# Patient Record
Sex: FEMALE | Race: WHITE | NOT HISPANIC OR LATINO | URBAN - METROPOLITAN AREA
[De-identification: names, ages, dates, MRNs, and addresses within clinical notes are randomized per-mention and may not be internally consistent; named-entity substitution may affect disease eponyms.]

---

## 2017-11-15 ENCOUNTER — APPOINTMENT (OUTPATIENT)
Dept: WOMENS IMAGING | Facility: HOSPITAL | Age: 47
End: 2017-11-15

## 2017-11-15 PROCEDURE — 77067 SCR MAMMO BI INCL CAD: CPT | Performed by: RADIOLOGY

## 2017-11-15 PROCEDURE — 77063 BREAST TOMOSYNTHESIS BI: CPT | Performed by: RADIOLOGY

## 2017-11-15 PROCEDURE — G0202 SCR MAMMO BI INCL CAD: HCPCS | Performed by: RADIOLOGY

## 2017-11-27 ENCOUNTER — APPOINTMENT (OUTPATIENT)
Dept: WOMENS IMAGING | Facility: HOSPITAL | Age: 47
End: 2017-11-27

## 2017-11-27 PROCEDURE — G0279 TOMOSYNTHESIS, MAMMO: HCPCS | Performed by: RADIOLOGY

## 2017-11-27 PROCEDURE — G0206 DX MAMMO INCL CAD UNI: HCPCS | Performed by: RADIOLOGY

## 2017-11-27 PROCEDURE — 77061 BREAST TOMOSYNTHESIS UNI: CPT | Performed by: RADIOLOGY

## 2017-11-27 PROCEDURE — 77065 DX MAMMO INCL CAD UNI: CPT | Performed by: RADIOLOGY

## 2017-12-06 ENCOUNTER — APPOINTMENT (OUTPATIENT)
Dept: WOMENS IMAGING | Facility: HOSPITAL | Age: 47
End: 2017-12-06

## 2017-12-06 PROCEDURE — 19081 BX BREAST 1ST LESION STRTCTC: CPT | Performed by: RADIOLOGY

## 2018-06-06 ENCOUNTER — APPOINTMENT (OUTPATIENT)
Dept: WOMENS IMAGING | Facility: HOSPITAL | Age: 48
End: 2018-06-06

## 2018-06-06 PROCEDURE — G0279 TOMOSYNTHESIS, MAMMO: HCPCS | Performed by: RADIOLOGY

## 2018-06-06 PROCEDURE — 77061 BREAST TOMOSYNTHESIS UNI: CPT | Performed by: RADIOLOGY

## 2018-06-06 PROCEDURE — 77065 DX MAMMO INCL CAD UNI: CPT | Performed by: RADIOLOGY

## 2020-07-06 ENCOUNTER — APPOINTMENT (OUTPATIENT)
Dept: WOMENS IMAGING | Facility: HOSPITAL | Age: 50
End: 2020-07-06

## 2020-07-06 PROCEDURE — 77067 SCR MAMMO BI INCL CAD: CPT | Performed by: RADIOLOGY

## 2020-07-06 PROCEDURE — 77063 BREAST TOMOSYNTHESIS BI: CPT | Performed by: RADIOLOGY

## 2020-07-30 VITALS
DIASTOLIC BLOOD PRESSURE: 70 MMHG | HEART RATE: 80 BPM | HEART RATE: 89 BPM | SYSTOLIC BLOOD PRESSURE: 107 MMHG | SYSTOLIC BLOOD PRESSURE: 101 MMHG | TEMPERATURE: 97 F | DIASTOLIC BLOOD PRESSURE: 59 MMHG | SYSTOLIC BLOOD PRESSURE: 102 MMHG | RESPIRATION RATE: 22 BRPM | DIASTOLIC BLOOD PRESSURE: 65 MMHG | SYSTOLIC BLOOD PRESSURE: 109 MMHG | SYSTOLIC BLOOD PRESSURE: 115 MMHG | DIASTOLIC BLOOD PRESSURE: 74 MMHG | OXYGEN SATURATION: 91 % | RESPIRATION RATE: 16 BRPM | HEART RATE: 90 BPM | RESPIRATION RATE: 9 BRPM | RESPIRATION RATE: 12 BRPM | HEART RATE: 78 BPM | SYSTOLIC BLOOD PRESSURE: 92 MMHG | HEART RATE: 71 BPM | HEART RATE: 81 BPM | OXYGEN SATURATION: 99 % | SYSTOLIC BLOOD PRESSURE: 108 MMHG | HEART RATE: 83 BPM | HEIGHT: 64 IN | OXYGEN SATURATION: 97 % | TEMPERATURE: 97.8 F | SYSTOLIC BLOOD PRESSURE: 120 MMHG | HEART RATE: 76 BPM | DIASTOLIC BLOOD PRESSURE: 62 MMHG | SYSTOLIC BLOOD PRESSURE: 112 MMHG | OXYGEN SATURATION: 98 % | DIASTOLIC BLOOD PRESSURE: 69 MMHG | DIASTOLIC BLOOD PRESSURE: 76 MMHG | OXYGEN SATURATION: 100 % | SYSTOLIC BLOOD PRESSURE: 89 MMHG | RESPIRATION RATE: 20 BRPM | WEIGHT: 145 LBS | RESPIRATION RATE: 15 BRPM | OXYGEN SATURATION: 95 %

## 2020-07-31 ENCOUNTER — OFFICE (AMBULATORY)
Dept: URBAN - METROPOLITAN AREA LAB 2 | Facility: LAB | Age: 50
End: 2020-07-31
Payer: COMMERCIAL

## 2020-07-31 DIAGNOSIS — Z11.59 ENCOUNTER FOR SCREENING FOR OTHER VIRAL DISEASES: ICD-10-CM

## 2020-07-31 PROCEDURE — 87633 RESP VIRUS 12-25 TARGETS: CPT | Performed by: INTERNAL MEDICINE

## 2020-08-03 PROBLEM — Z12.11 SCREENING FOR COLONIC NEOPLASIA: Status: ACTIVE | Noted: 2020-08-04

## 2020-08-04 ENCOUNTER — AMBULATORY SURGICAL CENTER (AMBULATORY)
Dept: URBAN - METROPOLITAN AREA SURGERY 17 | Facility: SURGERY | Age: 50
End: 2020-08-04
Payer: COMMERCIAL

## 2020-08-04 ENCOUNTER — OFFICE (AMBULATORY)
Dept: URBAN - METROPOLITAN AREA PATHOLOGY 4 | Facility: PATHOLOGY | Age: 50
End: 2020-08-04
Payer: COMMERCIAL

## 2020-08-04 DIAGNOSIS — K63.5 POLYP OF COLON: ICD-10-CM

## 2020-08-04 DIAGNOSIS — Z12.11 ENCOUNTER FOR SCREENING FOR MALIGNANT NEOPLASM OF COLON: ICD-10-CM

## 2020-08-04 LAB
GI HISTOLOGY: A. UNSPECIFIED: (no result)
GI HISTOLOGY: B. UNSPECIFIED: (no result)
GI HISTOLOGY: C. UNSPECIFIED: (no result)
GI HISTOLOGY: PDF REPORT: (no result)

## 2020-08-04 PROCEDURE — 88305 TISSUE EXAM BY PATHOLOGIST: CPT | Mod: 33 | Performed by: INTERNAL MEDICINE

## 2020-08-04 PROCEDURE — 45385 COLONOSCOPY W/LESION REMOVAL: CPT | Mod: 33 | Performed by: INTERNAL MEDICINE

## 2022-01-10 ENCOUNTER — LAB REQUISITION (OUTPATIENT)
Dept: LAB | Facility: HOSPITAL | Age: 52
End: 2022-01-10

## 2022-01-10 DIAGNOSIS — N20.2 CALCULUS OF KIDNEY WITH CALCULUS OF URETER: ICD-10-CM

## 2022-01-10 PROCEDURE — 82365 CALCULUS SPECTROSCOPY: CPT | Performed by: UROLOGY

## 2022-01-14 LAB
CALCIUM OXALATE DIHYDRATE MFR STONE IR: 30 %
COLOR STONE: NORMAL
COM MFR STONE: 70 %
COMPN STONE: NORMAL
LABORATORY COMMENT REPORT: NORMAL
Lab: NORMAL
Lab: NORMAL
PHOTO: NORMAL
SIZE STONE: NORMAL MM
SPEC SOURCE SUBJ: NORMAL
STONE ANALYSIS-IMP: NORMAL
WT STONE: 78 MG

## 2022-10-11 ENCOUNTER — APPOINTMENT (OUTPATIENT)
Dept: WOMENS IMAGING | Facility: HOSPITAL | Age: 52
End: 2022-10-11

## 2022-10-11 PROCEDURE — 77063 BREAST TOMOSYNTHESIS BI: CPT | Performed by: RADIOLOGY

## 2022-10-11 PROCEDURE — 77067 SCR MAMMO BI INCL CAD: CPT | Performed by: RADIOLOGY

## 2023-10-18 ENCOUNTER — APPOINTMENT (OUTPATIENT)
Dept: WOMENS IMAGING | Facility: HOSPITAL | Age: 53
End: 2023-10-18
Payer: COMMERCIAL

## 2023-10-18 PROCEDURE — 77067 SCR MAMMO BI INCL CAD: CPT | Performed by: RADIOLOGY

## 2023-10-18 PROCEDURE — 77063 BREAST TOMOSYNTHESIS BI: CPT | Performed by: RADIOLOGY

## 2023-11-07 ENCOUNTER — OFFICE VISIT (OUTPATIENT)
Dept: ORTHOPEDIC SURGERY | Facility: CLINIC | Age: 53
End: 2023-11-07
Payer: COMMERCIAL

## 2023-11-07 VITALS — WEIGHT: 147.3 LBS | BODY MASS INDEX: 26.1 KG/M2 | TEMPERATURE: 97.5 F | HEIGHT: 63 IN

## 2023-11-07 DIAGNOSIS — M17.11 PRIMARY OSTEOARTHRITIS OF RIGHT KNEE: ICD-10-CM

## 2023-11-07 DIAGNOSIS — R52 PAIN: Primary | ICD-10-CM

## 2023-11-07 PROCEDURE — 99203 OFFICE O/P NEW LOW 30 MIN: CPT | Performed by: ORTHOPAEDIC SURGERY

## 2023-11-07 RX ORDER — AZELASTINE 1 MG/ML
1-2 SPRAY, METERED NASAL
COMMUNITY
Start: 2023-06-09 | End: 2024-06-08

## 2023-11-07 RX ORDER — PROGESTERONE 100 MG/1
100 CAPSULE ORAL DAILY
COMMUNITY
Start: 2018-01-01

## 2023-11-07 RX ORDER — ESTRADIOL 0.04 MG/D
1 FILM, EXTENDED RELEASE TRANSDERMAL 2 TIMES WEEKLY
COMMUNITY
Start: 2022-01-01

## 2023-11-07 RX ORDER — LISDEXAMFETAMINE DIMESYLATE CAPSULES 40 MG/1
40 CAPSULE ORAL DAILY
COMMUNITY
Start: 2010-01-01 | End: 2024-01-04

## 2023-11-07 RX ORDER — TRIAMCINOLONE ACETONIDE 55 UG/1
SPRAY, METERED NASAL
COMMUNITY

## 2023-11-07 RX ORDER — HYDROXYZINE HYDROCHLORIDE 10 MG/1
10 TABLET, FILM COATED ORAL
COMMUNITY
Start: 2023-11-02 | End: 2023-12-02

## 2023-11-07 NOTE — PROGRESS NOTES
"Patient: Nelida Stallworth  YOB: 1970 53 y.o. female  Medical Record Number: [id number]    Chief Complaints:   Chief Complaint   Patient presents with    Right Knee - Initial Evaluation       History of Present Illness:Nelida Stallworth is a 53 y.o. 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 Seems like its improving.    Allergies: No Known Allergies    Medications:   Current Outpatient Medications   Medication Sig Dispense Refill    azelastine (ASTELIN) 0.1 % nasal spray 1-2 sprays into the nostril(s) as directed by provider.      estradiol (VIVELLE-DOT) 0.0375 MG/24HR patch Place 1 patch on the skin as directed by provider 2 (Two) Times a Week.      hydrOXYzine (ATARAX) 10 MG tablet Take 1 tablet by mouth.      lisdexamfetamine (VYVANSE) 40 MG capsule Take 1 capsule by mouth Daily      Progesterone (PROMETRIUM) 100 MG capsule Take 1 capsule by mouth Daily.      Triamcinolone Acetonide (NASACORT) 55 MCG/ACT nasal inhaler into the nostril(s) as directed by provider.       No current facility-administered medications for this visit.         The following portions of the patient's history were reviewed and updated as appropriate: allergies, current medications, past family history, past medical history, past social history, past surgical history and problem list.    Review of Systems:   Pertinent positives/negatives listed in HPI above    Physical Exam:   Vitals:    11/07/23 1026   Temp: 97.5 °F (36.4 °C)   Weight: 66.8 kg (147 lb 4.8 oz)   Height: 160 cm (63\")   PainSc:   6   PainLoc: Knee       General: A and O x 3, ASA, NAD      Knee Exam List: Knee:  right    ALIGNMENT:     Neutral  ,   Patella tracks   midline    GAIT:    Antalgic    SKIN:    No abnormality    RANGE OF MOTION:   Painful flexion    STRENGTH:   5 / 5    LIGAMENTS:    No varus / valgus instability.   Negative  " Lachman.    MENISCUS:     Positive  medial   Samantha       DISTAL PULSES:    Paplable    DISTAL SENSATION :   Intact    LYMPHATICS:     No   lymphadenopathy    OTHER:          - No effusion      - No crepitance with ROM        Radiology:  Xrays 3views right knee (ap,lateral, sunrise) were ordered and reviewed for evaluation of knee pain demonstrating mild joint space narrowing medial knee. There are no previous films for comparision.     Assessment/Plan: right knee medial compartment OA -  mild to mdoerate -  I recommended PT, NSAIDs prn, if not better or if it worsens at any point could consider injections.      Diagnoses and all orders for this visit:    1. Pain (Primary)  -     XR Knee 3 View Right         Emil Weinberg MD  11/7/2023

## 2023-11-28 ENCOUNTER — TREATMENT (OUTPATIENT)
Dept: PHYSICAL THERAPY | Facility: CLINIC | Age: 53
End: 2023-11-28
Payer: COMMERCIAL

## 2023-11-28 DIAGNOSIS — Z74.09 IMPAIRED FUNCTIONAL MOBILITY AND ACTIVITY TOLERANCE: ICD-10-CM

## 2023-11-28 DIAGNOSIS — M17.11 PRIMARY OSTEOARTHRITIS OF RIGHT KNEE: ICD-10-CM

## 2023-11-28 DIAGNOSIS — S83.249A ACUTE TEAR OF POSTERIOR HORN OF MEDIAL MENISCUS: Primary | ICD-10-CM

## 2023-11-28 DIAGNOSIS — M25.561 RIGHT KNEE PAIN, UNSPECIFIED CHRONICITY: ICD-10-CM

## 2023-11-28 PROCEDURE — 97530 THERAPEUTIC ACTIVITIES: CPT | Performed by: PHYSICAL THERAPIST

## 2023-11-28 PROCEDURE — 97110 THERAPEUTIC EXERCISES: CPT | Performed by: PHYSICAL THERAPIST

## 2023-11-28 PROCEDURE — 97161 PT EVAL LOW COMPLEX 20 MIN: CPT | Performed by: PHYSICAL THERAPIST

## 2023-11-28 NOTE — PROGRESS NOTES
Physical Therapy Initial Evaluation and Plan of Care    [address]   [place] [address]  [phone]  Patient: Nelida Stallworth   : 1970  Diagnosis/ICD-10 Code:  Right knee pain, unspecified chronicity [M25.561]  Referring practitioner: Emil Weinberg MD  Date of Initial Visit: 2023  Today's Date: 2023  Patient seen for 1 session         Visit Diagnoses:    ICD-10-CM ICD-9-CM   1. Right knee pain, unspecified chronicity  M25.561 719.46   2. Primary osteoarthritis of right knee  M17.11 715.16   3. Impaired functional mobility and activity tolerance  Z74.09 V49.89   4. 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  Felt a click in my R knee turning to get out of car and also squatted to get a kid from under the bed.  Went to [facility] - MRI showed a tear in posterior horn or meniscus and a Baker's cyst. Saw a PA who suggested surgery but didn't want. Dr. Weinberg said no surgery at this point - need to try cortisone and PT.  Just now coming out of respiratory illness - on Steroids.  Bakers's cyst much smaller now. New X-rays show mild-mod DJD.  Wear supportive shoes with PowerStep orthotics.  Ganglion cyst L and erazo's neuroma sx lateral ankle. Bursitis hips. Knee clicks still and some giving way initially. Lack confidence in it. No other significant PMH.      Patient Occupation: realtor; golf, dance   Precautions and Work Restrictions: nonePain  Current pain ratin  At worst pain ratin  Location: medial R knee  Quality: tight (weak)  Relieving factors: ice, heat and medications (straighten knee; movement)  Aggravating factors: stairs, prolonged positioning and squatting (driving)  Progression: improved    Social Support  Lives in: multiple-level home    Diagnostic Tests  X-ray:  abnormal  MRI studies: abnormal    Treatments  Previous treatment: medication  Current treatment: physical therapy  Patient Goals  Patient goals for therapy: return to sport/leisure activities  Patient goal: keep from getting worse - preserve it and avoid surgery           Objective          Palpation     Right   Hypertonic in the distal biceps femoris. Tenderness of the distal biceps femoris.     Tenderness     Right Knee   Tenderness in the medial joint line, pes anserinus and quadriceps tendon.     Active Range of Motion   Left Knee   Flexion: 130 degrees   Extension: 0 degrees     Right Knee   Flexion: 130 degrees   Extension: 0 degrees     Additional Active Range of Motion Details  Popped R    Strength/Myotome Testing     Left Hip   Planes of Motion   External rotation: 4+    Right Hip   Planes of Motion   Flexion: 4  Abduction: 5 (seated)  Adduction: 4  External rotation: 4+    Left Knee   Flexion: 5  Extension: 5    Right Knee   Flexion: 4+  Extension: 4-    Tests     Additional Tests Details  Deferred - MRI confirmed MMT    Right Knee Flexibility Comments:   HS, quad/hip flexors    Ambulation     Ambulation: Level Surfaces   Ambulation without assistive device: independent    Additional Level Surfaces Ambulation Details  Guarded but non-antalgic; B over pronation          Assessment & Plan       Assessment  Impairments: activity intolerance, impaired physical strength, lacks appropriate home exercise program and pain with function   Functional limitations: (Prolonged positions, driving, squatting, stairs)  Assessment details: Nelida Stallworth is 53 y.o. female who presents today to Physical Therapy for persistent pain from acute onset several months ago.  Imaging confirmed mild-mod OA, Baker's cyst and MMT - posterior horn. Has noted some recent improvement while being treated with steroids for respiratory condition but still with intermittent popping/clicking and fearful of giving way. Goal is to avoid  surgery. Presents today with mild-mod weakness and hip/knee mm tightness.  ROM is preserved. Patient would benefit from skilled physical therapy to address functional limitations and impairments to improve quality of life.  Prognosis: good    Goals  Plan Goals: STGs x 2 wks  1. Increasing flexibility and strength for improved ADLs  2. Pain with ADLs < 6/10  3. Review body mechanics and joint protection principles with ADLs  4. Ambulates level surface and 4-6 inch steps with min to no antalgia  5. Demonstrates tolerance for and compliance with initial HEP    LTGs x 4 wks  1. Independent with HEP and joint protection principles for maintenance and prevention  2. Strength 4+ to 5/5 per MMT  3. Ambulates 5 min and 6-8 inch steps with normal gait  4. Report decreased popping and clicking and no episodes of giving way in past week.  5. Demonstrates tolerance for sitting, including driving, for 60 min  6. Improved LEFS to < 30%    Plan  Therapy options: will be seen for skilled therapy services  Planned modality interventions: cryotherapy and ultrasound  Planned therapy interventions: manual therapy, soft tissue mobilization, therapeutic activities, strengthening, stretching, neuromuscular re-education, body mechanics training, balance/weight-bearing training and home exercise program  Frequency: 2x week  Duration in weeks: 4  Treatment plan discussed with: patient        History # of Personal Factors and/or Comorbidities: MODERATE (1-2)  Examination of Body System(s): # of elements: LOW (1-2)  Clinical Presentation: STABLE   Clinical Decision Making: LOW       Timed:         Manual Therapy:    0     mins  78885;     Therapeutic Exercise:    16     mins  33550;     Neuromuscular Gautam:    0    mins  23356;    Therapeutic Activity:     20     mins  61537;     Gait Trainin     mins  71488;     Ultrasound:     0     mins  22261;    Ionto                               0    mins   58396  Self Care                        0     mins   27327      Un-Timed:  Electrical Stimulation:    0     mins  98333 ( );  Dry Needling     0     mins self-pay  Traction     0     mins 36332  Low Eval     22     Mins  53794  Mod Eval     0     Mins  07579  High Eval                       0     Mins  88324  Canalith Repos    0     mins 46470      Timed Treatment:   36   mins   Total Treatment:     58   mins          PT: Jayda Torres PT     License Number: [id number]  Electronically signed by Jayda Torres PT, 11/28/23, 10:43 AM EST    Certification Period: 11/28/2023 thru 2/25/2024  I certify that the therapy services are furnished while this patient is under my care.  The services outlined above are required by this patient, and will be reviewed every 90 days.         Physician Signature:__________________________________________________    PHYSICIAN: Emil Weinberg MD  NPI: [id number]                                      DATE:      Please sign and return via fax to [email or web address] . Thank you, [facility].

## 2023-12-06 ENCOUNTER — TREATMENT (OUTPATIENT)
Dept: PHYSICAL THERAPY | Facility: CLINIC | Age: 53
End: 2023-12-06
Payer: COMMERCIAL

## 2023-12-06 DIAGNOSIS — S83.249A ACUTE TEAR OF POSTERIOR HORN OF MEDIAL MENISCUS: ICD-10-CM

## 2023-12-06 DIAGNOSIS — M25.561 RIGHT KNEE PAIN, UNSPECIFIED CHRONICITY: Primary | ICD-10-CM

## 2023-12-06 DIAGNOSIS — M17.11 PRIMARY OSTEOARTHRITIS OF RIGHT KNEE: ICD-10-CM

## 2023-12-06 DIAGNOSIS — Z74.09 IMPAIRED FUNCTIONAL MOBILITY AND ACTIVITY TOLERANCE: ICD-10-CM

## 2023-12-06 NOTE — PROGRESS NOTES
Physical Therapy Daily Treatment Note    Visit Diagnoses:    ICD-10-CM ICD-9-CM   1. Right knee pain, unspecified chronicity  M25.561 719.46   2. Primary osteoarthritis of right knee  M17.11 715.16   3. Impaired functional mobility and activity tolerance  Z74.09 V49.89   4. Acute tear of posterior horn of medial meniscus  S83.249A 836.0       VISIT#: 2      Nelida Stallworth reports: she is coming off steroids and is feeling a little jittery. Her knee is also getting a little more swelling and pain and her calf feels tight. Her Baker's cyst is about gone. She has a meniscal tear and runner's knee (PFS).   Current Pain Level:    4/10; Worst:   6-7/10; Best:  0/10  Affected Area: medial R knee   Quality of Pain: tight (weak)  Functional Deficits/Irritating Factors: stairs, prolonged positioning and squatting (driving)   Progression: major progress compared to where she was.  Compliance with HEP Reported: yes    Objective  Presents: 13 min late for appt. Due to traffic  Increased sets/reps of:  calf stretch   Increased resistance on:  none  Added to Program: KT Tape to knee to unload medial knee and lift patella.  SciFit, Step up and lateral step up    Tender in quad and patella tendons, Pes anserine/joint line area  Shakiness in leg during SLR    See Exercise, Manual, and Modality Logs for complete treatment.     Patient Education: Pt was educated on exercise biomechanical correctness, intensity, and speed.     Assessment:  Nelida has recently finished her steroids so having some increase in swelling and pain. Did not note any swelling in knee today but she had on stretch tights and did not compare with L knee. Applied KT tape with verbal instructions to remove if redness, rash, itching, etc occurs. Yuniel v/u. Progressed through her exercises nicely. Quad is quite weak, with shakiness present during SLR.  Needs reminders to count reps.  Pt will continue to benefit from skilled PT interventions to address current  functional deficits and impairments.       Plan: Progress to/Continue with current program.       Timed:  Manual Therapy:            0_    mins  98339;  Ultrasound:                     0      mins  74995;   Therapeutic Exercise:    25     mins  11022;     Neuromuscular Gautam:   0     mins  98766;    Therapeutic Activity:      20     mins  81261;      Iontophoresis              _0__   mins  Dry Needling               _0____   mins         Untimed:  Work Conditioning: __0__ mins 13583  Electrical Stimulation:    0    mins  37850 ( );  Mechanical Traction:       0        mins  03978;   Paraffin                       __0___  mins   Ice/Heat: __0__ mins      Timed Treatment:   45   mins   Total Treatment:     45   mins          BRET Rodney License # [id number]  Physical Therapist Assistant

## 2023-12-12 ENCOUNTER — TREATMENT (OUTPATIENT)
Dept: PHYSICAL THERAPY | Facility: CLINIC | Age: 53
End: 2023-12-12
Payer: COMMERCIAL

## 2023-12-12 DIAGNOSIS — S83.249A ACUTE TEAR OF POSTERIOR HORN OF MEDIAL MENISCUS: ICD-10-CM

## 2023-12-12 DIAGNOSIS — Z74.09 IMPAIRED FUNCTIONAL MOBILITY AND ACTIVITY TOLERANCE: ICD-10-CM

## 2023-12-12 DIAGNOSIS — M25.561 RIGHT KNEE PAIN, UNSPECIFIED CHRONICITY: Primary | ICD-10-CM

## 2023-12-12 DIAGNOSIS — M17.11 PRIMARY OSTEOARTHRITIS OF RIGHT KNEE: ICD-10-CM

## 2023-12-12 NOTE — PROGRESS NOTES
Physical Therapy Daily Treatment Note      [address] [facility]   [place] [address] [phone]  Patient: Nelida Stallworth   : 1970  Referring practitioner: Emil Weinberg MD   Date of Initial Visit: Type: THERAPY  Noted: 2023  Today's Date: 2023  Patient seen for 3 sessions          Visit Diagnoses:    ICD-10-CM ICD-9-CM   1. Right knee pain, unspecified chronicity  M25.561 719.46   2. Primary osteoarthritis of right knee  M17.11 715.16   3. Impaired functional mobility and activity tolerance  Z74.09 V49.89   4. Acute tear of posterior horn of medial meniscus  S83.249A 836.0       Subjective   The tape felt a little uncomfortable and restrictive while on and painful when taking off but the clicking is less. Was sore after last session.  Feel better since off steroids - it had me jittery.    Objective   See Exercise, Manual, and Modality Logs for complete treatment.   Light bruising still at distal/medial quad    Assessment/Plan  Responded well to addition of US and lighter taping.  Tolerated progression of strengthening.    Continue to progress per POC as tolerated.  Assess delayed response to changes in Rx.    Timed:         Manual Therapy:    0     mins  50505;     Therapeutic Exercise:    24     mins  67006;     Neuromuscular Gautam:    3    mins  88166;    Therapeutic Activity:     12     mins  88853;     Gait Trainin     mins  88206;     Ultrasound:     9     mins  66378;    Ionto                               0    mins   61470  Self Care                       0     mins   97127      Un-Timed:  Electrical Stimulation:    0     mins  71422 ( );  Dry Needling     0     mins self-pay  Traction     0     mins 59742  Canalith Repos    0     mins 78552    Timed Treatment:   48   mins   Total Treatment:     48   mins    Jayda Torres, PT  [place] License: [id number]

## 2023-12-15 ENCOUNTER — TREATMENT (OUTPATIENT)
Dept: PHYSICAL THERAPY | Facility: CLINIC | Age: 53
End: 2023-12-15
Payer: COMMERCIAL

## 2023-12-15 DIAGNOSIS — S83.249A ACUTE TEAR OF POSTERIOR HORN OF MEDIAL MENISCUS: ICD-10-CM

## 2023-12-15 DIAGNOSIS — M17.11 PRIMARY OSTEOARTHRITIS OF RIGHT KNEE: ICD-10-CM

## 2023-12-15 DIAGNOSIS — Z74.09 IMPAIRED FUNCTIONAL MOBILITY AND ACTIVITY TOLERANCE: ICD-10-CM

## 2023-12-15 DIAGNOSIS — M25.561 RIGHT KNEE PAIN, UNSPECIFIED CHRONICITY: Primary | ICD-10-CM

## 2023-12-15 NOTE — PROGRESS NOTES
"Physical Therapy Daily Treatment Note    Visit Diagnoses:    ICD-10-CM ICD-9-CM   1. Right knee pain, unspecified chronicity  M25.561 719.46   2. Primary osteoarthritis of right knee  M17.11 715.16   3. Impaired functional mobility and activity tolerance  Z74.09 V49.89   4. Acute tear of posterior horn of medial meniscus  S83.249A 836.0       VISIT#: 4      Nelida Stallworth reports: Her knee is doing good. The taping helped but she wants to see how she does without her knee being taped today. The bruise on her medial mid thigh has decreased in color intensity.  Current Pain Level:    0/10; Worst:   1-2/10; Best:  0/10  Affected Area: medial R knee, medial meniscus   Quality of Pain: tight (weak)  Functional Deficits/Irritating Factors: stairs, prolonged positioning and squatting (driving)   Progression: improving  Compliance with HEP Reported: Yes    Objective    Increased sets/reps of:   time on scifit   Increased resistance on:  SL Clams  Added to Program: Diagonal stepping, Standing Hip Extension        See Exercise, Manual, and Modality Logs for complete treatment.     Patient Education: Pt was educated on exercise biomechanical correctness, intensity, and speed.     Assessment:  Nelida's knee is doing much better per her subjective report. She does her exercises methodically and with good technique. Minimal cues needed and good compliance with her HEP. Her bruising has not decreased in size but is much more difficult to detect on her skin now. She progressed to 6\" step today but toward the end of her reps had some increased knee pain. However, she had to be cautioned not to plant the ball of her foot, turn around to do the step again and twist her knee. This probable aggravated her knee more than the step height. Did keep lateral steps at 4\" height. NO other issues during her session. Pt will continue to benefit from skilled PT interventions to address current functional deficits and impairments. "       Plan: Progress to/Continue with current program.       Timed:  Manual Therapy:            0_    mins  91813;  Ultrasound:                     10      mins  48444;   Therapeutic Exercise:    35     mins  08020;     Neuromuscular Gautam:   0     mins  32141;    Therapeutic Activity:      15     mins  88908;      Iontophoresis              _0__   mins  Dry Needling               _0____   mins         Untimed:  Work Conditioning: __0__ mins 78710  Electrical Stimulation:    0    mins  40941 ( );  Mechanical Traction:       0        mins  01395;   Paraffin                       __0___  mins   Ice/Heat: __0__ mins      Timed Treatment:   60   mins   Total Treatment:     60   mins          BRET Rodney License # [id number]  Physical Therapist Assistant

## 2023-12-18 ENCOUNTER — TREATMENT (OUTPATIENT)
Dept: PHYSICAL THERAPY | Facility: CLINIC | Age: 53
End: 2023-12-18
Payer: COMMERCIAL

## 2023-12-18 DIAGNOSIS — S83.249A ACUTE TEAR OF POSTERIOR HORN OF MEDIAL MENISCUS: ICD-10-CM

## 2023-12-18 DIAGNOSIS — M25.561 RIGHT KNEE PAIN, UNSPECIFIED CHRONICITY: Primary | ICD-10-CM

## 2023-12-18 DIAGNOSIS — M17.11 PRIMARY OSTEOARTHRITIS OF RIGHT KNEE: ICD-10-CM

## 2023-12-18 DIAGNOSIS — Z74.09 IMPAIRED FUNCTIONAL MOBILITY AND ACTIVITY TOLERANCE: ICD-10-CM

## 2023-12-18 NOTE — PROGRESS NOTES
Physical Therapy Daily Treatment Note      [address] [facility]   [place] [address]  [phone]  Patient: Nelida Stallworth   : 1970  Referring practitioner: Emil Weinberg MD   Date of Initial Visit: Type: THERAPY  Noted: 2023  Today's Date: 2023  Patient seen for 5 sessions          Visit Diagnoses:    ICD-10-CM ICD-9-CM   1. Right knee pain, unspecified chronicity  M25.561 719.46   2. Primary osteoarthritis of right knee  M17.11 715.16   3. Impaired functional mobility and activity tolerance  Z74.09 V49.89   4. Acute tear of posterior horn of medial meniscus  S83.249A 836.0       Subjective   Doing pretty good.  Pushed myself a bit last session.  Had some clicking but not too aggravated.  Did OK without the tape. That area that we are doing US on is much better.    Objective   See Exercise, Manual, and Modality Logs for complete treatment.       Assessment/Plan  Good response to US with decreased pain and tenderness at medial quad at bruised area. Tolerating progression of activities with report of muscle fatigue and mild soreness but no increased joint pain at knee.    Continue per POC as tolerated.    Timed:         Manual Therapy:    0     mins  45440;     Therapeutic Exercise:    28    mins  24152;     Neuromuscular Gautam:    0    mins  58753;    Therapeutic Activity:     16     mins  45690;     Gait Trainin     mins  68050;     Ultrasound:     9     mins  97234;    Ionto                               0    mins   19135  Self Care                       0     mins   36963      Un-Timed:  Electrical Stimulation:    0     mins  65307 ( );  Dry Needling     0     mins self-pay  Traction     0     mins 70875  Canalith Repos    0     mins 21742    Timed Treatment:   53   mins   Total Treatment:     53   mins    Jayda Torres, PT  [place] License: [id number]

## 2023-12-26 ENCOUNTER — TREATMENT (OUTPATIENT)
Dept: PHYSICAL THERAPY | Facility: CLINIC | Age: 53
End: 2023-12-26
Payer: COMMERCIAL

## 2023-12-26 NOTE — PROGRESS NOTES
Physical Therapy MD Note/Progreess Note          [address] [place] [address]  [phone]  12/26/2023  Emil Weinberg MD    Re: Nelida Stallworth  1970  Emil Weinberg Md  [address]  [facility] [address]  [place] [address]   ________________________________________________________________    Ms. Nelida Stallworth, has attended 6 PT sessions.  Treatment has consisted of: there-ex/activities, modalities, trial of taping, HEP and pt education     Subjective: Ms. Nelida Stallworth states: think I'm ready to be done with PT.  Still some pain going down stairs but doing OK.  No recent clicking.        Nelida Stallworth reports: she feels ready to just continue on her own    LEFS 11.25%    Objective          Tenderness     Right Knee   No tenderness in the medial joint line, pes anserinus and quadriceps tendon.     Additional Tenderness Details  Distal medial quad at bruised area - mild only now    Active Range of Motion   Left Knee   Flexion: 140 degrees   Extension: 0 degrees     Right Knee   Flexion: 140 degrees   Extension: 0 degrees     Strength/Myotome Testing     Left Hip   Planes of Motion   External rotation: 4+    Right Hip   Planes of Motion   Flexion: 4  Abduction: 5 (seated)  Adduction: 4  External rotation: 4+    Left Knee   Flexion: 5  Extension: 5    Right Knee   Flexion: 5  Extension: 4+    Tests     Additional Tests Details  Deferred - MRI confirmed MMT    Right Knee Flexibility Comments:   HS    Ambulation     Ambulation: Level Surfaces   Ambulation without assistive device: independent    Additional Level Surfaces Ambulation Details  non-antalgic      See Exercise, Manual, and Modality Logs for complete treatment.       Assessment/Plan  Good response to PT with only minimal signs/sxs remaining.  Is independent with a HEP and desires to continue with that and D/C formal PT.    Plan Goals: STGs x 2 wks  1. Increasing flexibility and strength for improved ADLs MET  2. Pain  with ADLs < 6/10 MET  3. Review body mechanics and joint protection principles with ADLs MET  4. Ambulates level surface and 4-6 inch steps with min to no antalgia MET  5. Demonstrates tolerance for and compliance with initial HEP MET     LTGs x 4 wks  1. Independent with HEP and joint protection principles for maintenance and prevention MET  2. Strength 4+ to 5/5 per MMT MET  3. Ambulates 5 min and 6-8 inch steps with normal gait MET  4. Report decreased popping and clicking and no episodes of giving way in past week. MET  5. Demonstrates tolerance for sitting, including driving, for 60 min MET  6. Improved LEFS to < 30% MET    D/C to HEP             Manual Therapy:    0     mins  39642;  Therapeutic Exercise:    12     mins  58895;     Neuromuscular Gautam:    0    mins  95170;    Therapeutic Activity:     19     mins  18512;     Gait Trainin     mins  10164;     Ultrasound:     9     mins  28530;    Work Hardening           0      mins 60083  E-stim                0   mins 86952    Timed Treatment:   40   mins   Total Treatment:     40   mins    Jayda Torres PT  Physical Therapist  [place] #[id number]

## 2023-12-26 NOTE — LETTER
Physical Therapy MD Note/Progreess Note          [address] [place] [address]  [phone]  12/26/2023  Emil Weinberg MD    Re: Nelida Stallworth  1970  Emil Weinberg Md  [address]  [facility] [address]  [place] [address]   ________________________________________________________________    Ms. Nelida Stallworth, has attended 6 PT sessions.  Treatment has consisted of: there-ex/activities, modalities, trial of taping, HEP and pt education     Subjective: Ms. Nelida Stallworth states: think I'm ready to be done with PT.  Still some pain going down stairs but doing OK.  No recent clicking.        Nelida Stallworth reports: she feels ready to just continue on her own    LEFS 11.25%    Objective          Tenderness     Right Knee   No tenderness in the medial joint line, pes anserinus and quadriceps tendon.     Additional Tenderness Details  Distal medial quad at bruised area - mild only now    Active Range of Motion   Left Knee   Flexion: 140 degrees   Extension: 0 degrees     Right Knee   Flexion: 140 degrees   Extension: 0 degrees     Strength/Myotome Testing     Left Hip   Planes of Motion   External rotation: 4+    Right Hip   Planes of Motion   Flexion: 4  Abduction: 5 (seated)  Adduction: 4  External rotation: 4+    Left Knee   Flexion: 5  Extension: 5    Right Knee   Flexion: 5  Extension: 4+    Tests     Additional Tests Details  Deferred - MRI confirmed MMT    Right Knee Flexibility Comments:   HS    Ambulation     Ambulation: Level Surfaces   Ambulation without assistive device: independent    Additional Level Surfaces Ambulation Details  non-antalgic      See Exercise, Manual, and Modality Logs for complete treatment.       Assessment/Plan  Good response to PT with only minimal signs/sxs remaining.  Is independent with a HEP and desires to continue with that and D/C formal PT.    Plan Goals: STGs x 2 wks  1. Increasing flexibility and strength for improved ADLs MET  2.  Pain with ADLs < 6/10 MET  3. Review body mechanics and joint protection principles with ADLs MET  4. Ambulates level surface and 4-6 inch steps with min to no antalgia MET  5. Demonstrates tolerance for and compliance with initial HEP MET     LTGs x 4 wks  1. Independent with HEP and joint protection principles for maintenance and prevention MET  2. Strength 4+ to 5/5 per MMT MET  3. Ambulates 5 min and 6-8 inch steps with normal gait MET  4. Report decreased popping and clicking and no episodes of giving way in past week. MET  5. Demonstrates tolerance for sitting, including driving, for 60 min MET  6. Improved LEFS to < 30% MET    D/C to HEP             Manual Therapy:    0     mins  73193;  Therapeutic Exercise:    12     mins  75655;     Neuromuscular Gautam:    0    mins  61908;    Therapeutic Activity:     19     mins  49758;     Gait Trainin     mins  66192;     Ultrasound:     9     mins  76053;    Work Hardening           0      mins 93621  E-stim                0   mins 62601    Timed Treatment:   40   mins   Total Treatment:     40   mins    Jayda Torres PT  Physical Therapist  [place] #[id number]

## 2024-10-25 ENCOUNTER — APPOINTMENT (OUTPATIENT)
Dept: WOMENS IMAGING | Facility: HOSPITAL | Age: 54
End: 2024-10-25
Payer: COMMERCIAL

## 2024-10-25 PROCEDURE — 77063 BREAST TOMOSYNTHESIS BI: CPT | Performed by: RADIOLOGY

## 2024-10-25 PROCEDURE — 77067 SCR MAMMO BI INCL CAD: CPT | Performed by: RADIOLOGY

## 2024-11-12 ENCOUNTER — APPOINTMENT (OUTPATIENT)
Dept: WOMENS IMAGING | Facility: HOSPITAL | Age: 54
End: 2024-11-12
Payer: COMMERCIAL

## 2024-11-12 PROCEDURE — 77065 DX MAMMO INCL CAD UNI: CPT | Performed by: RADIOLOGY

## 2024-11-12 PROCEDURE — 76642 ULTRASOUND BREAST LIMITED: CPT | Performed by: RADIOLOGY

## 2024-11-12 PROCEDURE — 77061 BREAST TOMOSYNTHESIS UNI: CPT | Performed by: RADIOLOGY

## 2024-11-12 PROCEDURE — G0279 TOMOSYNTHESIS, MAMMO: HCPCS | Performed by: RADIOLOGY

## 2025-06-03 ENCOUNTER — APPOINTMENT (OUTPATIENT)
Dept: WOMENS IMAGING | Facility: HOSPITAL | Age: 55
End: 2025-06-03
Payer: COMMERCIAL

## 2025-06-03 PROCEDURE — G0279 TOMOSYNTHESIS, MAMMO: HCPCS | Performed by: RADIOLOGY

## 2025-06-03 PROCEDURE — 77065 DX MAMMO INCL CAD UNI: CPT | Performed by: RADIOLOGY

## 2025-06-03 PROCEDURE — 77061 BREAST TOMOSYNTHESIS UNI: CPT | Performed by: RADIOLOGY
